# Patient Record
Sex: FEMALE | Race: BLACK OR AFRICAN AMERICAN | NOT HISPANIC OR LATINO | Employment: UNEMPLOYED | URBAN - METROPOLITAN AREA
[De-identification: names, ages, dates, MRNs, and addresses within clinical notes are randomized per-mention and may not be internally consistent; named-entity substitution may affect disease eponyms.]

---

## 2017-01-06 ENCOUNTER — TELEPHONE (OUTPATIENT)
Dept: ENDOSCOPY | Facility: HOSPITAL | Age: 42
End: 2017-01-06

## 2017-01-18 ENCOUNTER — PATIENT MESSAGE (OUTPATIENT)
Dept: GYNECOLOGIC ONCOLOGY | Facility: CLINIC | Age: 42
End: 2017-01-18

## 2017-07-06 ENCOUNTER — PATIENT MESSAGE (OUTPATIENT)
Dept: SURGERY | Facility: CLINIC | Age: 42
End: 2017-07-06

## 2017-07-14 ENCOUNTER — OFFICE VISIT (OUTPATIENT)
Dept: GYNECOLOGIC ONCOLOGY | Facility: CLINIC | Age: 42
End: 2017-07-14
Attending: OBSTETRICS & GYNECOLOGY
Payer: COMMERCIAL

## 2017-07-14 VITALS
BODY MASS INDEX: 26.09 KG/M2 | SYSTOLIC BLOOD PRESSURE: 137 MMHG | HEART RATE: 62 BPM | DIASTOLIC BLOOD PRESSURE: 76 MMHG | WEIGHT: 152 LBS

## 2017-07-14 DIAGNOSIS — N87.9 CERVICAL DYSPLASIA: Primary | ICD-10-CM

## 2017-07-14 PROCEDURE — 88175 CYTOPATH C/V AUTO FLUID REDO: CPT

## 2017-07-14 PROCEDURE — 99214 OFFICE O/P EST MOD 30 MIN: CPT | Mod: S$GLB,,, | Performed by: OBSTETRICS & GYNECOLOGY

## 2017-07-14 PROCEDURE — 99999 PR PBB SHADOW E&M-EST. PATIENT-LVL II: CPT | Mod: PBBFAC,,, | Performed by: OBSTETRICS & GYNECOLOGY

## 2017-07-14 NOTE — PROGRESS NOTES
Subjective:      Patient ID: Desiree Martinez is a 41 y.o. female.    Chief Complaint: Cervical Dysplasia      HPI  Returns today for f/u.  EMB was negative.  Here today for repeat pap.  No new symptoms.  Review of Systems   Constitutional: Negative for activity change, appetite change, chills, fatigue and fever.   HENT: Negative for hearing loss, mouth sores, nosebleeds, sore throat and tinnitus.    Eyes: Negative for visual disturbance.   Respiratory: Negative for cough, chest tightness, shortness of breath and wheezing.    Cardiovascular: Negative for chest pain and leg swelling.   Gastrointestinal: Negative for abdominal distention, abdominal pain, blood in stool, constipation, diarrhea, nausea and vomiting.   Genitourinary: Negative for dysuria, flank pain, frequency, hematuria, pelvic pain, vaginal bleeding, vaginal discharge and vaginal pain.   Musculoskeletal: Negative for arthralgias and back pain.   Skin: Negative for rash.   Neurological: Negative for dizziness, seizures, syncope, weakness and numbness.   Hematological: Does not bruise/bleed easily.   Psychiatric/Behavioral: Negative for confusion and sleep disturbance. The patient is not nervous/anxious.         Objective:   Physical Exam:   Constitutional: She is oriented to person, place, and time. She appears well-developed and well-nourished. No distress.    HENT:   Head: Normocephalic and atraumatic.    Eyes: No scleral icterus.    Neck: Normal range of motion. Neck supple.    Cardiovascular: Normal rate and intact distal pulses.  Exam reveals no cyanosis and no edema.     Pulmonary/Chest: Effort normal. No respiratory distress. She exhibits no tenderness.        Abdominal: Soft. Normal appearance. She exhibits no distension, no fluid wave, no ascites and no mass. There is no tenderness. There is no rigidity, no rebound and no guarding. No hernia.     Genitourinary: Rectum normal, vagina normal and uterus normal. Pelvic exam was performed  with patient supine. There is no rash, tenderness or lesion on the right labia. There is no rash, tenderness or lesion on the left labia. Uterus is not deviated, not tender and not hosting fibroids. Cervix is normal. Right adnexum displays no mass, no tenderness and no fullness. Left adnexum displays no mass, no tenderness and no fullness. No bleeding in the vagina. No vaginal discharge found. Labial bartholins normal.Cervix exhibits discharge and friability. Cervix exhibits no motion tenderness.           Musculoskeletal: Normal range of motion and moves all extremeties. She exhibits no edema.      Lymphadenopathy:     She has no cervical adenopathy.        Right: No inguinal adenopathy present.        Left: No inguinal adenopathy present.    Neurological: She is alert and oriented to person, place, and time.    Skin: Skin is warm. No rash noted. No cyanosis or erythema.    Psychiatric: She has a normal mood and affect. Thought content normal.       Assessment:     1. Cervical dysplasia        Plan:       Cervix friable.  Will f/u pap.  Will call with results and plan.

## 2017-07-20 ENCOUNTER — TELEPHONE (OUTPATIENT)
Dept: GYNECOLOGIC ONCOLOGY | Facility: CLINIC | Age: 42
End: 2017-07-20

## 2017-07-20 NOTE — TELEPHONE ENCOUNTER
----- Message from Perez Pratt MD sent at 7/20/2017  2:27 PM CDT -----  Please let her know pap normal

## 2017-08-01 ENCOUNTER — OFFICE VISIT (OUTPATIENT)
Dept: SURGERY | Facility: CLINIC | Age: 42
End: 2017-08-01
Payer: COMMERCIAL

## 2017-08-01 VITALS
DIASTOLIC BLOOD PRESSURE: 94 MMHG | HEART RATE: 68 BPM | SYSTOLIC BLOOD PRESSURE: 149 MMHG | WEIGHT: 160.5 LBS | BODY MASS INDEX: 27.55 KG/M2

## 2017-08-01 DIAGNOSIS — Z85.048 PERSONAL HISTORY OF MALIGNANT NEOPLASM OF RECTUM, RECTOSIGMOID JUNCTION, AND ANUS: Primary | ICD-10-CM

## 2017-08-01 PROCEDURE — 99214 OFFICE O/P EST MOD 30 MIN: CPT | Mod: 25,S$GLB,, | Performed by: COLON & RECTAL SURGERY

## 2017-08-01 PROCEDURE — 45330 DIAGNOSTIC SIGMOIDOSCOPY: CPT | Mod: S$GLB,,, | Performed by: COLON & RECTAL SURGERY

## 2017-08-01 PROCEDURE — 99999 PR PBB SHADOW E&M-EST. PATIENT-LVL II: CPT | Mod: PBBFAC,,, | Performed by: COLON & RECTAL SURGERY

## 2017-08-01 NOTE — PROGRESS NOTES
CRS office visit    Visit Info:      DATE OF PROCEDURE: 06/15/2016.  PREOPERATIVE DIAGNOSIS: Rectal cancer 12/16 loop ileostomy.6/15  POSTOPERATIVE DIAGNOSIS: Rectal cancer loop ileostomy.  PROCEDURE: Closure of loop ileostomy with small-bowel resection.  Path RESECTION OF RECTOSIGMOID SHOWING NO EVIDENCE OF RESIDUAL INVASIVE  ADENOCARCINOMA. DIVERTICULOSIS IS PRESENT. A SMALL FOCUS OF METASTATIC CARCINOMA IS  IDENTIFIED IN ONE LYMPH NODE THAT SHOWS CHANGES OF PRIOR TREATMENT. SEE SYNOPTIC  REPORT:  SPECIMEN: RECTUM AND SIGMOID COLON  PROCEDURE: RESECTION OF RECTUM AND SIGMOID  TUMOR SITE: NO RESIDUAL CARCINOMA IDENTIFIED  TUMOR SIZE: NOT APPLICABLE  MACROSCOPIC TUMOR PERFORATION: NOT IDENTIFIED  HISTOLOGIC TYPE: NO RESIDUAL CARCINOMA IDENTIFIED FOLLOWING TREATMENT  HISTOLOGIC GRADE: NOT APPLICABLE  MICROSCOPIC TUMOR EXTENSION: NO EVIDENCE OF PRIMARY TUMOR  MARGINS: ALL MARGINS OF RESECTION ARE NEGATIVE  TREATMENT EFFECT: PRESENT, NO RESIDUAL TUMOR IDENTIFIED (GRADE 0)  LYMPHOVASCULAR INVASION: NOT IDENTIFIED  PERINEURAL INVASION: NOT IDENTIFIED  TUMOR DEPOSITS: NOT IDENTIFIED  LYMPH NODE STATUS: ONE OUT OF 12 (1/12) LYMPH NODES SHOWS A FOCUS OF METASTATIC  ADENOCARCINOMA PRESENT IN A LYMPH NODE WITH TREATMENT EFFECT.  TUMOR STAGE: ypT0 N1a  2, 3. BENIGN COLONIC MUCOSA       Current Status: Doing well postoperatively. LARS improved.some perianal discomfort when sitting.  He did have an outside CT scan in November 2016 which showed no sign of recurrence  Review of Systems      Constitutional: No fever, chills, or change in activity or appetite.      HENT: No hearing loss, swelling, neck pain or stiffness.       Eyes: No  Itching, discharge, and visual disturbance.      Respiratory: No cough, choking or shortness of breath.       Cardiovascular: No leg swelling or chest pain      Gastrointestinal: No abdominal distention or change in bowel habbits     Genitourinary: No dysuria, frequency or flank pain.       Musculoskeletal: No trouble walking or arthralgias.      Neurological: No weakness, dizziness, or seizures .      Hematological: No adenopathy.      Psychiatric/Behavioral: No hallucinations or changes in behavior.  Remainder ROS  Negative except per HPI    Physical Exam:  General: Black or  female in NAD sitting in chair in clinic  Neuro: aaox4 maex4 perrl  Respiratory: resps even unlabored  Cardiac: cap refill <2 sec  Abdomen: Normal, benign. Incisions:clean, dry, intact     After verbal consent and 2 fleets enemas flexible sigmoidoscopy was performed there was no sign of recurrence no polyps the anastomosis was widely patent       Assessment and Plan:     Rectal cancer status post low anterior resection with loop ileostomy.  Doing well  We'll check a CEA level today and she will follow-up with me in 6 months for another flexible sigmoidoscopy

## 2018-03-13 ENCOUNTER — PATIENT MESSAGE (OUTPATIENT)
Dept: SURGERY | Facility: CLINIC | Age: 43
End: 2018-03-13

## 2018-03-13 ENCOUNTER — OFFICE VISIT (OUTPATIENT)
Dept: SURGERY | Facility: CLINIC | Age: 43
End: 2018-03-13
Payer: COMMERCIAL

## 2018-03-13 VITALS
DIASTOLIC BLOOD PRESSURE: 81 MMHG | BODY MASS INDEX: 28.79 KG/M2 | SYSTOLIC BLOOD PRESSURE: 147 MMHG | HEART RATE: 62 BPM | HEIGHT: 64 IN | WEIGHT: 168.63 LBS

## 2018-03-13 DIAGNOSIS — Z85.048 PERSONAL HISTORY OF MALIGNANT NEOPLASM OF RECTUM, RECTOSIGMOID JUNCTION, AND ANUS: Primary | ICD-10-CM

## 2018-03-13 PROCEDURE — 99214 OFFICE O/P EST MOD 30 MIN: CPT | Mod: 25,S$GLB,, | Performed by: COLON & RECTAL SURGERY

## 2018-03-13 PROCEDURE — 99999 PR PBB SHADOW E&M-EST. PATIENT-LVL III: CPT | Mod: PBBFAC,,, | Performed by: COLON & RECTAL SURGERY

## 2018-03-13 PROCEDURE — 45330 DIAGNOSTIC SIGMOIDOSCOPY: CPT | Mod: S$GLB,,, | Performed by: COLON & RECTAL SURGERY

## 2018-03-13 NOTE — PROGRESS NOTES
CRS office visit    Visit Info:      DATE OF PROCEDURE: 06/15/2016.  PREOPERATIVE DIAGNOSIS: Rectal cancer 12/16 loop ileostomy.6/15  POSTOPERATIVE DIAGNOSIS: Rectal cancer loop ileostomy.  PROCEDURE: Closure of loop ileostomy with small-bowel resection.  Path RESECTION OF RECTOSIGMOID SHOWING NO EVIDENCE OF RESIDUAL INVASIVE  ADENOCARCINOMA. DIVERTICULOSIS IS PRESENT. A SMALL FOCUS OF METASTATIC CARCINOMA IS  IDENTIFIED IN ONE LYMPH NODE THAT SHOWS CHANGES OF PRIOR TREATMENT. SEE SYNOPTIC  REPORT:  SPECIMEN: RECTUM AND SIGMOID COLON  PROCEDURE: RESECTION OF RECTUM AND SIGMOID  TUMOR SITE: NO RESIDUAL CARCINOMA IDENTIFIED  TUMOR SIZE: NOT APPLICABLE  MACROSCOPIC TUMOR PERFORATION: NOT IDENTIFIED  HISTOLOGIC TYPE: NO RESIDUAL CARCINOMA IDENTIFIED FOLLOWING TREATMENT  HISTOLOGIC GRADE: NOT APPLICABLE  MICROSCOPIC TUMOR EXTENSION: NO EVIDENCE OF PRIMARY TUMOR  MARGINS: ALL MARGINS OF RESECTION ARE NEGATIVE  TREATMENT EFFECT: PRESENT, NO RESIDUAL TUMOR IDENTIFIED (GRADE 0)  LYMPHOVASCULAR INVASION: NOT IDENTIFIED  PERINEURAL INVASION: NOT IDENTIFIED  TUMOR DEPOSITS: NOT IDENTIFIED  LYMPH NODE STATUS: ONE OUT OF 12 (1/12) LYMPH NODES SHOWS A FOCUS OF METASTATIC  ADENOCARCINOMA PRESENT IN A LYMPH NODE WITH TREATMENT EFFECT.  TUMOR STAGE: ypT0 N1a  2, 3. BENIGN COLONIC MUCOSA       Current Status: Doing well postoperatively. LARS improved.although stil with some urgency  He did have an outside CT scan in November 2017which showed no sign of recurrence  Review of Systems      Constitutional: No fever, chills, or change in activity or appetite.      HENT: No hearing loss, swelling, neck pain or stiffness.       Eyes: No  Itching, discharge, and visual disturbance.      Respiratory: No cough, choking or shortness of breath.       Cardiovascular: No leg swelling or chest pain      Gastrointestinal: No abdominal distention or change in bowel habbits     Genitourinary: No dysuria, frequency or flank pain.      Musculoskeletal:  No trouble walking or arthralgias.      Neurological: No weakness, dizziness, or seizures .      Hematological: No adenopathy.      Psychiatric/Behavioral: No hallucinations or changes in behavior.  Remainder ROS  Negative except per HPI    Physical Exam:  General: Black or  female in NAD sitting in chair in clinic  Neuro: aaox4 maex4 perrl  Respiratory: resps even unlabored  Cardiac: cap refill <2 sec  Abdomen: Normal, benign. Incisions:clean, dry, intact     After verbal consent and 2 fleets enemas flexible sigmoidoscopy was performed there was no sign of recurrence no polyps the anastomosis was widely patent       Assessment and Plan:     Rectal cancer status post low anterior resection with loop ileostomy.  Doing well    she will follow-up with me in 6 months for another flexible sigmoidoscopy

## 2018-04-27 ENCOUNTER — TELEPHONE (OUTPATIENT)
Dept: GYNECOLOGIC ONCOLOGY | Facility: CLINIC | Age: 43
End: 2018-04-27

## 2018-04-27 NOTE — TELEPHONE ENCOUNTER
----- Message from Avelinalynda Roberts sent at 4/27/2018 10:02 AM CDT -----  Contact: [facility] [place]            Name of Who is Calling: Renetta/Dr Olson's office      What is the request in detail: requesting the clinical notes for the patient they need to know if she had a pap done. Please fax.       Can the clinic reply by [facility]: No      What [facility] to [facility] Back [facility]: [phone] Fax

## 2018-09-20 ENCOUNTER — OFFICE VISIT (OUTPATIENT)
Dept: SURGERY | Facility: CLINIC | Age: 43
End: 2018-09-20
Payer: COMMERCIAL

## 2018-09-20 VITALS
DIASTOLIC BLOOD PRESSURE: 91 MMHG | WEIGHT: 165.81 LBS | HEIGHT: 64 IN | SYSTOLIC BLOOD PRESSURE: 143 MMHG | HEART RATE: 71 BPM | BODY MASS INDEX: 28.31 KG/M2

## 2018-09-20 DIAGNOSIS — Z85.048 PERSONAL HISTORY OF MALIGNANT NEOPLASM OF RECTUM, RECTOSIGMOID JUNCTION, AND ANUS: Primary | ICD-10-CM

## 2018-09-20 PROCEDURE — 99999 PR PBB SHADOW E&M-EST. PATIENT-LVL III: CPT | Mod: PBBFAC,,, | Performed by: COLON & RECTAL SURGERY

## 2018-09-20 PROCEDURE — 3008F BODY MASS INDEX DOCD: CPT | Mod: CPTII,S$GLB,, | Performed by: COLON & RECTAL SURGERY

## 2018-09-20 PROCEDURE — 99214 OFFICE O/P EST MOD 30 MIN: CPT | Mod: 25,S$GLB,, | Performed by: COLON & RECTAL SURGERY

## 2018-09-20 PROCEDURE — 46600 DIAGNOSTIC ANOSCOPY SPX: CPT | Mod: S$GLB,,, | Performed by: COLON & RECTAL SURGERY

## 2019-02-26 ENCOUNTER — TELEPHONE (OUTPATIENT)
Dept: SURGERY | Facility: CLINIC | Age: 44
End: 2019-02-26

## 2019-02-26 NOTE — TELEPHONE ENCOUNTER
Spoke with patient. Offered FU appointment with another provider. States she wants to wait until mid March to make that decision. She will call back.

## 2019-02-26 NOTE — TELEPHONE ENCOUNTER
----- Message from Quirino Voss sent at 2/26/2019  1:37 PM CST -----  Contact: Pt:[phone]  .Patient Requesting Sooner Appointment.     Reason for sooner appt.:Pt states she needs to follow up with   When is the first available appointment?03/16/19  Communication Preference:Pt:[phone]  Additional Information:Pt called and states she would prefer a Thursday at about 10:30am.

## 2019-05-07 ENCOUNTER — PATIENT MESSAGE (OUTPATIENT)
Dept: SURGERY | Facility: CLINIC | Age: 44
End: 2019-05-07

## 2019-05-20 ENCOUNTER — OFFICE VISIT (OUTPATIENT)
Dept: SURGERY | Facility: CLINIC | Age: 44
End: 2019-05-20
Payer: COMMERCIAL

## 2019-05-20 VITALS
DIASTOLIC BLOOD PRESSURE: 77 MMHG | SYSTOLIC BLOOD PRESSURE: 148 MMHG | BODY MASS INDEX: 28.04 KG/M2 | HEIGHT: 64 IN | HEART RATE: 66 BPM | WEIGHT: 164.25 LBS

## 2019-05-20 DIAGNOSIS — Z85.048 PERSONAL HISTORY OF MALIGNANT NEOPLASM OF RECTUM, RECTOSIGMOID JUNCTION, AND ANUS: Primary | ICD-10-CM

## 2019-05-20 PROCEDURE — 99999 PR PBB SHADOW E&M-EST. PATIENT-LVL III: CPT | Mod: PBBFAC,,, | Performed by: COLON & RECTAL SURGERY

## 2019-05-20 PROCEDURE — 3008F PR BODY MASS INDEX (BMI) DOCUMENTED: ICD-10-PCS | Mod: CPTII,S$GLB,, | Performed by: COLON & RECTAL SURGERY

## 2019-05-20 PROCEDURE — 99214 PR OFFICE/OUTPT VISIT, EST, LEVL IV, 30-39 MIN: ICD-10-PCS | Mod: 25,S$GLB,, | Performed by: COLON & RECTAL SURGERY

## 2019-05-20 PROCEDURE — 3008F BODY MASS INDEX DOCD: CPT | Mod: CPTII,S$GLB,, | Performed by: COLON & RECTAL SURGERY

## 2019-05-20 PROCEDURE — 99999 PR PBB SHADOW E&M-EST. PATIENT-LVL III: ICD-10-PCS | Mod: PBBFAC,,, | Performed by: COLON & RECTAL SURGERY

## 2019-05-20 PROCEDURE — 99214 OFFICE O/P EST MOD 30 MIN: CPT | Mod: 25,S$GLB,, | Performed by: COLON & RECTAL SURGERY

## 2019-05-20 NOTE — PROGRESS NOTES
CRS office visit    Visit Info:      DATE OF PROCEDURE: 06/15/2016.  PREOPERATIVE DIAGNOSIS: Rectal cancer 12/16 loop ileostomy.6/15  POSTOPERATIVE DIAGNOSIS: Rectal cancer loop ileostomy.  PROCEDURE: Closure of loop ileostomy with small-bowel resection.  Path RESECTION OF RECTOSIGMOID SHOWING NO EVIDENCE OF RESIDUAL INVASIVE  ADENOCARCINOMA. DIVERTICULOSIS IS PRESENT. A SMALL FOCUS OF METASTATIC CARCINOMA IS  IDENTIFIED IN ONE LYMPH NODE THAT SHOWS CHANGES OF PRIOR TREATMENT. SEE SYNOPTIC  REPORT:  SPECIMEN: RECTUM AND SIGMOID COLON  PROCEDURE: RESECTION OF RECTUM AND SIGMOID  TUMOR SITE: NO RESIDUAL CARCINOMA IDENTIFIED  TUMOR SIZE: NOT APPLICABLE  MACROSCOPIC TUMOR PERFORATION: NOT IDENTIFIED  HISTOLOGIC TYPE: NO RESIDUAL CARCINOMA IDENTIFIED FOLLOWING TREATMENT  HISTOLOGIC GRADE: NOT APPLICABLE  MICROSCOPIC TUMOR EXTENSION: NO EVIDENCE OF PRIMARY TUMOR  MARGINS: ALL MARGINS OF RESECTION ARE NEGATIVE  TREATMENT EFFECT: PRESENT, NO RESIDUAL TUMOR IDENTIFIED (GRADE 0)  LYMPHOVASCULAR INVASION: NOT IDENTIFIED  PERINEURAL INVASION: NOT IDENTIFIED  TUMOR DEPOSITS: NOT IDENTIFIED  LYMPH NODE STATUS: ONE OUT OF 12 (1/12) LYMPH NODES SHOWS A FOCUS OF METASTATIC  ADENOCARCINOMA PRESENT IN A LYMPH NODE WITH TREATMENT EFFECT.  TUMOR STAGE: ypT0 N1a  2, 3. BENIGN COLONIC MUCOSA       Current Status: Doing well postoperatively. LARS improved.although stil with some urgency.  She is continuing to take fiber supplementation which does seem to help have suggested that she change this to PN rather than a  Review of Systems      Constitutional: No fever, chills, or change in activity or appetite.      HENT: No hearing loss, swelling, neck pain or stiffness.       Eyes: No  Itching, discharge, and visual disturbance.      Respiratory: No cough, choking or shortness of breath.       Cardiovascular: No leg swelling or chest pain      Gastrointestinal: No abdominal distention or change in bowel habbits     Genitourinary: No dysuria,  frequency or flank pain.      Musculoskeletal: No trouble walking or arthralgias.      Neurological: No weakness, dizziness, or seizures .      Hematological: No adenopathy.      Psychiatric/Behavioral: No hallucinations or changes in behavior.  Remainder ROS  Negative except per HPI    Physical Exam:  General: Black or  female in NAD sitting in chair in clinic  Neuro: aaox4 maex4 perrl  Respiratory: resps even unlabored  Cardiac: cap refill <2 sec  Abdomen: Normal, benign. Incisions:clean, dry, intact     After verbal consent and 2 fleets enemas flexible sigmoidoscopy was performed there was no sign of recurrence no polyps the anastomosis was widely patent       Assessment and Plan:     Rectal cancer status post low anterior resection with loop ileostomy.  Doing well    she will follow-up with me in1 year    for another flexible sigmoidoscopy  And will undergo colonoscopy in [place] closer to home

## 2019-11-25 ENCOUNTER — PATIENT MESSAGE (OUTPATIENT)
Dept: SURGERY | Facility: CLINIC | Age: 44
End: 2019-11-25

## 2020-11-24 ENCOUNTER — PATIENT MESSAGE (OUTPATIENT)
Dept: ADMINISTRATIVE | Facility: OTHER | Age: 45
End: 2020-11-24